# Patient Record
Sex: FEMALE | Race: BLACK OR AFRICAN AMERICAN | Employment: FULL TIME | URBAN - METROPOLITAN AREA
[De-identification: names, ages, dates, MRNs, and addresses within clinical notes are randomized per-mention and may not be internally consistent; named-entity substitution may affect disease eponyms.]

---

## 2017-05-06 ENCOUNTER — OFFICE VISIT (OUTPATIENT)
Dept: FAMILY MEDICINE CLINIC | Age: 32
End: 2017-05-06

## 2017-05-06 VITALS
SYSTOLIC BLOOD PRESSURE: 110 MMHG | BODY MASS INDEX: 31.79 KG/M2 | WEIGHT: 186.2 LBS | DIASTOLIC BLOOD PRESSURE: 70 MMHG | HEIGHT: 64 IN

## 2017-05-06 DIAGNOSIS — Z00.00 PREVENTATIVE HEALTH CARE: ICD-10-CM

## 2017-05-06 DIAGNOSIS — L03.011 PARONYCHIA OF FINGER OF RIGHT HAND: Primary | ICD-10-CM

## 2017-05-06 DIAGNOSIS — Z23 NEED FOR PNEUMOCOCCAL VACCINATION: ICD-10-CM

## 2017-05-06 PROCEDURE — 99213 OFFICE O/P EST LOW 20 MIN: CPT | Performed by: FAMILY MEDICINE

## 2017-05-06 RX ORDER — SULFAMETHOXAZOLE AND TRIMETHOPRIM 800; 160 MG/1; MG/1
1 TABLET ORAL 2 TIMES DAILY
Qty: 20 TABLET | Refills: 0 | Status: SHIPPED | OUTPATIENT
Start: 2017-05-06 | End: 2017-05-15 | Stop reason: SDUPTHER

## 2017-05-08 ENCOUNTER — TELEPHONE (OUTPATIENT)
Dept: FAMILY MEDICINE CLINIC | Age: 32
End: 2017-05-08

## 2017-05-15 ENCOUNTER — TELEPHONE (OUTPATIENT)
Dept: FAMILY MEDICINE CLINIC | Age: 32
End: 2017-05-15

## 2017-05-15 RX ORDER — SULFAMETHOXAZOLE AND TRIMETHOPRIM 800; 160 MG/1; MG/1
1 TABLET ORAL 2 TIMES DAILY
Qty: 8 TABLET | Refills: 0 | Status: SHIPPED | OUTPATIENT
Start: 2017-05-15 | End: 2017-05-19

## 2017-12-26 ENCOUNTER — OFFICE VISIT (OUTPATIENT)
Dept: FAMILY MEDICINE CLINIC | Age: 32
End: 2017-12-26

## 2017-12-26 VITALS
SYSTOLIC BLOOD PRESSURE: 120 MMHG | HEIGHT: 64 IN | WEIGHT: 193 LBS | BODY MASS INDEX: 32.95 KG/M2 | DIASTOLIC BLOOD PRESSURE: 76 MMHG

## 2017-12-26 DIAGNOSIS — M65.831 EXTENSOR TENOSYNOVITIS OF RIGHT WRIST: Primary | ICD-10-CM

## 2017-12-26 PROCEDURE — 99213 OFFICE O/P EST LOW 20 MIN: CPT | Performed by: FAMILY MEDICINE

## 2017-12-26 RX ORDER — METHYLPREDNISOLONE 4 MG/1
TABLET ORAL
Qty: 21 TABLET | Refills: 0 | Status: SHIPPED | OUTPATIENT
Start: 2017-12-26 | End: 2019-08-14

## 2018-09-17 ENCOUNTER — TELEPHONE (OUTPATIENT)
Dept: FAMILY MEDICINE CLINIC | Age: 33
End: 2018-09-17

## 2018-09-17 DIAGNOSIS — R06.02 SOB (SHORTNESS OF BREATH): ICD-10-CM

## 2018-09-17 DIAGNOSIS — R07.9 CHEST PAIN, UNSPECIFIED TYPE: Primary | ICD-10-CM

## 2018-09-18 ENCOUNTER — TELEPHONE (OUTPATIENT)
Dept: FAMILY MEDICINE CLINIC | Age: 33
End: 2018-09-18

## 2019-08-14 ENCOUNTER — OFFICE VISIT (OUTPATIENT)
Dept: FAMILY MEDICINE CLINIC | Age: 34
End: 2019-08-14
Payer: COMMERCIAL

## 2019-08-14 VITALS
WEIGHT: 187.4 LBS | HEIGHT: 64 IN | BODY MASS INDEX: 31.99 KG/M2 | SYSTOLIC BLOOD PRESSURE: 122 MMHG | DIASTOLIC BLOOD PRESSURE: 80 MMHG

## 2019-08-14 DIAGNOSIS — R60.9 DEPENDENT EDEMA: ICD-10-CM

## 2019-08-14 DIAGNOSIS — E16.2 HYPOGLYCEMIA: ICD-10-CM

## 2019-08-14 DIAGNOSIS — Z00.00 WELL ADULT EXAM: Primary | ICD-10-CM

## 2019-08-14 LAB
A/G RATIO: 1.7 (ref 1.1–2.2)
ALBUMIN SERPL-MCNC: 4.3 G/DL (ref 3.4–5)
ALP BLD-CCNC: 70 U/L (ref 40–129)
ALT SERPL-CCNC: 12 U/L (ref 10–40)
ANION GAP SERPL CALCULATED.3IONS-SCNC: 11 MMOL/L (ref 3–16)
AST SERPL-CCNC: 18 U/L (ref 15–37)
BILIRUB SERPL-MCNC: 0.6 MG/DL (ref 0–1)
BILIRUBIN, POC: NORMAL
BLOOD URINE, POC: NORMAL
BUN BLDV-MCNC: 8 MG/DL (ref 7–20)
CALCIUM SERPL-MCNC: 9.9 MG/DL (ref 8.3–10.6)
CHLORIDE BLD-SCNC: 105 MMOL/L (ref 99–110)
CHOLESTEROL, TOTAL: 177 MG/DL (ref 0–199)
CLARITY, POC: NORMAL
CO2: 25 MMOL/L (ref 21–32)
COLOR, POC: YELLOW
CREAT SERPL-MCNC: 0.7 MG/DL (ref 0.6–1.1)
GFR AFRICAN AMERICAN: >60
GFR NON-AFRICAN AMERICAN: >60
GLOBULIN: 2.6 G/DL
GLUCOSE BLD-MCNC: 85 MG/DL (ref 70–99)
GLUCOSE URINE, POC: NORMAL
HCT VFR BLD CALC: 39.8 % (ref 36–48)
HDLC SERPL-MCNC: 76 MG/DL (ref 40–60)
HEMOGLOBIN: 13.5 G/DL (ref 12–16)
KETONES, POC: NORMAL
LDL CHOLESTEROL CALCULATED: 88 MG/DL
LEUKOCYTE EST, POC: NORMAL
MCH RBC QN AUTO: 30.8 PG (ref 26–34)
MCHC RBC AUTO-ENTMCNC: 33.9 G/DL (ref 31–36)
MCV RBC AUTO: 90.6 FL (ref 80–100)
NITRITE, POC: NORMAL
PDW BLD-RTO: 13.8 % (ref 12.4–15.4)
PH, POC: 7.5
PLATELET # BLD: 324 K/UL (ref 135–450)
PMV BLD AUTO: 8.4 FL (ref 5–10.5)
POTASSIUM SERPL-SCNC: 4.2 MMOL/L (ref 3.5–5.1)
PROTEIN, POC: NORMAL
RBC # BLD: 4.39 M/UL (ref 4–5.2)
SODIUM BLD-SCNC: 141 MMOL/L (ref 136–145)
SPECIFIC GRAVITY, POC: 1.02
TOTAL PROTEIN: 6.9 G/DL (ref 6.4–8.2)
TRIGL SERPL-MCNC: 64 MG/DL (ref 0–150)
TSH SERPL DL<=0.05 MIU/L-ACNC: 0.78 UIU/ML (ref 0.27–4.2)
UROBILINOGEN, POC: 0.2
VITAMIN B-12: 702 PG/ML (ref 211–911)
VITAMIN D 25-HYDROXY: 27.8 NG/ML
VLDLC SERPL CALC-MCNC: 13 MG/DL
WBC # BLD: 5.3 K/UL (ref 4–11)

## 2019-08-14 PROCEDURE — 99395 PREV VISIT EST AGE 18-39: CPT | Performed by: FAMILY MEDICINE

## 2019-08-14 PROCEDURE — 81002 URINALYSIS NONAUTO W/O SCOPE: CPT | Performed by: FAMILY MEDICINE

## 2019-08-14 PROCEDURE — 36415 COLL VENOUS BLD VENIPUNCTURE: CPT | Performed by: FAMILY MEDICINE

## 2019-08-14 RX ORDER — BLOOD-GLUCOSE METER
1 KIT MISCELLANEOUS DAILY
Qty: 1 KIT | Refills: 0 | Status: SHIPPED | OUTPATIENT
Start: 2019-08-14

## 2019-08-14 RX ORDER — GLUCOSAMINE HCL/CHONDROITIN SU 500-400 MG
CAPSULE ORAL
Qty: 50 STRIP | Refills: 0 | Status: SHIPPED | OUTPATIENT
Start: 2019-08-14

## 2019-08-14 RX ORDER — LANCETS 30 GAUGE
1 EACH MISCELLANEOUS DAILY
Qty: 50 EACH | Refills: 2 | Status: SHIPPED | OUTPATIENT
Start: 2019-08-14

## 2019-08-14 ASSESSMENT — PATIENT HEALTH QUESTIONNAIRE - PHQ9
SUM OF ALL RESPONSES TO PHQ9 QUESTIONS 1 & 2: 0
1. LITTLE INTEREST OR PLEASURE IN DOING THINGS: 0
2. FEELING DOWN, DEPRESSED OR HOPELESS: 0
SUM OF ALL RESPONSES TO PHQ QUESTIONS 1-9: 0
SUM OF ALL RESPONSES TO PHQ QUESTIONS 1-9: 0

## 2019-08-14 ASSESSMENT — ENCOUNTER SYMPTOMS
RESPIRATORY NEGATIVE: 1
GASTROINTESTINAL NEGATIVE: 1

## 2019-08-14 NOTE — PROGRESS NOTES
Subjective:      Patient ID: Dorys Chappell is a 36 y.o. female. 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 Respiratory: Negative. Cardiovascular: Negative. Gastrointestinal: Negative. Skin: Negative. Neurological: Negative. Psychiatric/Behavioral: Negative. Allergies   Allergen Reactions    Amoxicillin     Duricef [Cefadroxil Monohydrate]     Penicillins     Tobramycin      Eye drops     Current Outpatient Medications   Medication Sig Dispense Refill    glucose monitoring kit (FREESTYLE) monitoring kit 1 kit by Does not apply route daily 1 kit 0    blood glucose monitor strips Test daily if feels sugar is low 50 strip 0    Lancets MISC 1 each by Does not apply route daily 50 each 2    ibuprofen (ADVIL;MOTRIN) 800 MG tablet Take 800 mg by mouth      fluticasone (FLOVENT HFA) 110 MCG/ACT inhaler INHALE TWO PUFFS BY MOUTH TWO TIMES A DAY 3 Inhaler 1    albuterol (PROAIR HFA) 108 (90 BASE) MCG/ACT inhaler INHALE ONE TO TWO PUFFS BY MOUTH EVERY 4 TO 6 HOURS AS NEEDED 17 g 3    hydrocortisone 2.5 % cream APPLY TO AFFECTED AREA(S) TWO TIMES A DAY 60 g 3    Spacer/Aero-Holding Chambers (AEROCHAMBER MV) MISC As directed 1 each 0     No current facility-administered medications for this visit.       Past Medical History:   Diagnosis Date    Allergic rhinitis     Asthma     Carpal tunnel syndrome     Eczema     Edema of both legs     Fatigue     Hyperlipidemia     Internal hemorrhoids      Past Surgical History:   Procedure Laterality Date    COLONOSCOPY      WISDOM TOOTH EXTRACTION       Social History Normocephalic. Right Ear: External ear normal.   Left Ear: External ear normal.   Nose: Nose normal.   Mouth/Throat: Oropharynx is clear and moist.   Eyes: Pupils are equal, round, and reactive to light. Conjunctivae and EOM are normal. Left eye exhibits no discharge. Neck: Normal range of motion. No thyromegaly present. Cardiovascular: Normal rate, regular rhythm, normal heart sounds and intact distal pulses. No murmur heard. Pulmonary/Chest: Effort normal. No respiratory distress. She has no wheezes. She has no rales. Abdominal: Soft. She exhibits no distension and no mass. There is no guarding. Lymphadenopathy:     She has no cervical adenopathy. Neurological: She is alert and oriented to person, place, and time. She has normal reflexes. Skin: Skin is warm and dry. No rash noted. No erythema. Psychiatric: She has a normal mood and affect. Her behavior is normal. Judgment and thought content normal.   Nursing note and vitals reviewed. Assessment:      Assessment/plan;  Stanislaw Castellano was seen today for annual exam.    Diagnoses and all orders for this visit:    Well adult exam  -     CBC  -     Comprehensive Metabolic Panel  -     Lipid Panel  -     Hemoglobin A1C  -     TSH without Reflex  -     Vitamin B12  -     Vitamin D 25 Hydroxy  -     POCT Urinalysis no Micro    Hypoglycemia  -     glucose monitoring kit (FREESTYLE) monitoring kit; 1 kit by Does not apply route daily  -     blood glucose monitor strips;  Test daily if feels sugar is low  -     Lancets MISC; 1 each by Does not apply route daily    Dependent edema    where compression hose while working   Watch salt  Discussed healthy lifestyle  Trev Barba

## 2019-08-15 LAB
ESTIMATED AVERAGE GLUCOSE: 116.9 MG/DL
HBA1C MFR BLD: 5.7 %

## 2019-11-06 ENCOUNTER — TELEPHONE (OUTPATIENT)
Dept: FAMILY MEDICINE CLINIC | Age: 34
End: 2019-11-06

## 2019-11-06 DIAGNOSIS — J45.40 MODERATE PERSISTENT ASTHMA WITHOUT COMPLICATION: Primary | ICD-10-CM

## 2019-11-08 RX ORDER — INHALER, ASSIST DEVICES
SPACER (EA) MISCELLANEOUS
Qty: 1 EACH | Refills: 0 | Status: SHIPPED | OUTPATIENT
Start: 2019-11-08

## 2019-11-08 RX ORDER — ALBUTEROL SULFATE 1.25 MG/3ML
1 SOLUTION RESPIRATORY (INHALATION) EVERY 6 HOURS PRN
Qty: 90 ML | Refills: 3 | Status: SHIPPED | OUTPATIENT
Start: 2019-11-08

## 2020-01-13 ENCOUNTER — TELEPHONE (OUTPATIENT)
Dept: FAMILY MEDICINE CLINIC | Age: 35
End: 2020-01-13

## 2020-01-13 NOTE — TELEPHONE ENCOUNTER
Patient is coming in on Wednesday for a urine check. She is having frequency and pain after urination. Need orders.

## 2020-01-15 ENCOUNTER — NURSE ONLY (OUTPATIENT)
Dept: FAMILY MEDICINE CLINIC | Age: 35
End: 2020-01-15
Payer: COMMERCIAL

## 2020-01-15 LAB
BILIRUBIN, POC: NORMAL
BLOOD URINE, POC: NORMAL
CLARITY, POC: NORMAL
COLOR, POC: NORMAL
GLUCOSE URINE, POC: NORMAL
KETONES, POC: NORMAL
LEUKOCYTE EST, POC: NORMAL
NITRITE, POC: NORMAL
PH, POC: 7
PROTEIN, POC: NORMAL
SPECIFIC GRAVITY, POC: 1.01
UROBILINOGEN, POC: 0.2

## 2020-01-15 PROCEDURE — 81002 URINALYSIS NONAUTO W/O SCOPE: CPT | Performed by: FAMILY MEDICINE

## 2020-01-21 ENCOUNTER — TELEPHONE (OUTPATIENT)
Dept: FAMILY MEDICINE CLINIC | Age: 35
End: 2020-01-21

## 2020-01-30 ENCOUNTER — TELEPHONE (OUTPATIENT)
Dept: FAMILY MEDICINE CLINIC | Age: 35
End: 2020-01-30

## 2020-01-30 NOTE — TELEPHONE ENCOUNTER
Dull aching pain in her left calf; this morning also had pain on left back of her thigh; denies warmth or redness at either site. Denies recent travel in January. Her parents do have a family history of blood clots. appt tomorrow or duplex?

## 2020-01-31 ENCOUNTER — TELEPHONE (OUTPATIENT)
Dept: FAMILY MEDICINE CLINIC | Age: 35
End: 2020-01-31

## 2020-01-31 NOTE — TELEPHONE ENCOUNTER
Patient scheduled at [facility] for STAT duplex today at 9:30 am.  [facility] [phone]  F: [phone]  Order faxed to attn of Alla Woodward. Patient advised. Vascular will call the office and hold the patient if she is positive for DVT.   LUCAS

## 2020-01-31 NOTE — TELEPHONE ENCOUNTER
Patient is calling back again to find out if dr Hillary Anthony wants her to have tests done or come in for an appt. If tests are needed she needs to go to .  Please return call

## 2020-02-03 ENCOUNTER — TELEPHONE (OUTPATIENT)
Dept: FAMILY MEDICINE CLINIC | Age: 35
End: 2020-02-03

## 2020-02-04 ENCOUNTER — OFFICE VISIT (OUTPATIENT)
Dept: FAMILY MEDICINE CLINIC | Age: 35
End: 2020-02-04
Payer: COMMERCIAL

## 2020-02-04 VITALS
HEIGHT: 64 IN | OXYGEN SATURATION: 96 % | DIASTOLIC BLOOD PRESSURE: 72 MMHG | WEIGHT: 196 LBS | BODY MASS INDEX: 33.46 KG/M2 | HEART RATE: 71 BPM | SYSTOLIC BLOOD PRESSURE: 118 MMHG

## 2020-02-04 LAB
A/G RATIO: 1.5 (ref 1.1–2.2)
ALBUMIN SERPL-MCNC: 4.2 G/DL (ref 3.4–5)
ALP BLD-CCNC: 69 U/L (ref 40–129)
ALT SERPL-CCNC: 13 U/L (ref 10–40)
ANION GAP SERPL CALCULATED.3IONS-SCNC: 15 MMOL/L (ref 3–16)
AST SERPL-CCNC: 18 U/L (ref 15–37)
BILIRUB SERPL-MCNC: 0.4 MG/DL (ref 0–1)
BUN BLDV-MCNC: 8 MG/DL (ref 7–20)
CALCIUM SERPL-MCNC: 9.8 MG/DL (ref 8.3–10.6)
CHLORIDE BLD-SCNC: 104 MMOL/L (ref 99–110)
CO2: 23 MMOL/L (ref 21–32)
CREAT SERPL-MCNC: 0.7 MG/DL (ref 0.6–1.1)
D DIMER: <200 NG/ML DDU (ref 0–229)
GFR AFRICAN AMERICAN: >60
GFR NON-AFRICAN AMERICAN: >60
GLOBULIN: 2.8 G/DL
GLUCOSE BLD-MCNC: 76 MG/DL (ref 70–99)
MAGNESIUM: 2 MG/DL (ref 1.8–2.4)
POTASSIUM SERPL-SCNC: 3.8 MMOL/L (ref 3.5–5.1)
SODIUM BLD-SCNC: 142 MMOL/L (ref 136–145)
TOTAL PROTEIN: 7 G/DL (ref 6.4–8.2)

## 2020-02-04 PROCEDURE — 99213 OFFICE O/P EST LOW 20 MIN: CPT | Performed by: FAMILY MEDICINE

## 2020-02-04 PROCEDURE — 36415 COLL VENOUS BLD VENIPUNCTURE: CPT | Performed by: FAMILY MEDICINE

## 2020-02-04 RX ORDER — TIZANIDINE 4 MG/1
4 TABLET ORAL 3 TIMES DAILY PRN
Qty: 30 TABLET | Refills: 0 | Status: SHIPPED | OUTPATIENT
Start: 2020-02-04 | End: 2021-04-28

## 2020-02-04 ASSESSMENT — ENCOUNTER SYMPTOMS
GASTROINTESTINAL NEGATIVE: 1
RESPIRATORY NEGATIVE: 1

## 2020-02-04 ASSESSMENT — PATIENT HEALTH QUESTIONNAIRE - PHQ9
SUM OF ALL RESPONSES TO PHQ QUESTIONS 1-9: 0
SUM OF ALL RESPONSES TO PHQ QUESTIONS 1-9: 0
1. LITTLE INTEREST OR PLEASURE IN DOING THINGS: 0
SUM OF ALL RESPONSES TO PHQ9 QUESTIONS 1 & 2: 0
2. FEELING DOWN, DEPRESSED OR HOPELESS: 0

## 2020-02-04 NOTE — PROGRESS NOTES
Subjective:      Patient ID: Kaushal Duncan is a 36 y.o. female. Leg Pain    The incident occurred 3 to 5 days ago. There was no injury mechanism. The pain is present in the left leg (left calf below knee and above ankle ). The quality of the pain is described as aching and cramping. The pain is at a severity of 5/10. The pain is moderate. The pain has been fluctuating since onset. Associated symptoms include an inability to bear weight. She reports no foreign bodies present. The symptoms are aggravated by movement, weight bearing and palpation. She has tried acetaminophen and rest for the symptoms. The treatment provided no relief. venous doppler negative    Review of Systems   Constitutional: Negative. Respiratory: Negative. Cardiovascular: Negative. Gastrointestinal: Negative. Musculoskeletal: Positive for arthralgias, gait problem and myalgias. Skin: Negative.       YOB: 1985    Date of Visit:  2/4/2020    Allergies   Allergen Reactions    Amoxicillin     Duricef [Cefadroxil Monohydrate]     Penicillins     Tobramycin      Eye drops       Outpatient Medications Marked as Taking for the 2/4/20 encounter (Office Visit) with Cecilia Page, DO   Medication Sig Dispense Refill    tiZANidine (ZANAFLEX) 4 MG tablet Take 1 tablet by mouth 3 times daily as needed (leg cramping) 30 tablet 0    albuterol (ACCUNEB) 1.25 MG/3ML nebulizer solution Inhale 3 mLs into the lungs every 6 hours as needed for Wheezing 90 mL 3    Spacer/Aero-Holding Chambers (AEROCHAMBER MV) MISC For use with inhaler  Dx asthma 1 each 0    glucose monitoring kit (FREESTYLE) monitoring kit 1 kit by Does not apply route daily 1 kit 0    blood glucose monitor strips Test daily if feels sugar is low 50 strip 0    Lancets MISC 1 each by Does not apply route daily 50 each 2    ibuprofen (ADVIL;MOTRIN) 800 MG tablet Take 800 mg by mouth      hydrocortisone 2.5 % cream APPLY TO AFFECTED AREA(S) TWO TIMES A

## 2020-02-27 ENCOUNTER — TELEPHONE (OUTPATIENT)
Dept: FAMILY MEDICINE CLINIC | Age: 35
End: 2020-02-27

## 2020-02-27 NOTE — TELEPHONE ENCOUNTER
Patient states that steven called her today she would like a return phone call. She also has questions about her labs. Please return call.

## 2020-04-07 ENCOUNTER — TELEPHONE (OUTPATIENT)
Dept: FAMILY MEDICINE CLINIC | Age: 35
End: 2020-04-07

## 2020-04-07 NOTE — TELEPHONE ENCOUNTER
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  States she is already using her inhaler a little more frequently right now due to it being spring time and the pollen being up

## 2020-04-18 NOTE — PROGRESS NOTES
Subjective:      Patient ID: Ronnette Carrel is a 33 y.o. female. HPI     Right Arm Swelling:  Patient states that 10 days ago, she was stirring macaroni and cheese and began to feel pain in the dorsal right wrist and distal forearm. It has bothered her since then especially with any use of the right hand. She states that she has been taking Ibuprofen and using ice and wrapping it. It was previously sore to the touch but that has resolved. She denies any known injury or prior history. Review of Systems   Constitutional: Negative for chills and fever. Musculoskeletal: Positive for myalgias. /76 (Site: Left Arm)   Ht 5' 3.5\" (1.613 m)   Wt 193 lb (87.5 kg)   BMI 33.65 kg/m²    Objective:   Physical Exam   Constitutional: She is oriented to person, place, and time. She appears well-developed and well-nourished. No distress. Musculoskeletal:   There is mild pain and swelling on palpation of the dorsal right wrist / forearm. There is some friction palpated on wrist ROM. Neurological: She is alert and oriented to person, place, and time. Skin: She is not diaphoretic.        Assessment:      Right Wrist Tenosynovitis       Plan:      Medrol Dosepak as directed  Continue Ibuprofen / Wraps and Ice  RTO per Dr. Junior Irizarry walking/toileting/standing

## 2020-04-28 ENCOUNTER — OFFICE VISIT (OUTPATIENT)
Dept: FAMILY MEDICINE CLINIC | Age: 35
End: 2020-04-28
Payer: COMMERCIAL

## 2020-04-28 VITALS
DIASTOLIC BLOOD PRESSURE: 62 MMHG | BODY MASS INDEX: 34.2 KG/M2 | SYSTOLIC BLOOD PRESSURE: 128 MMHG | HEIGHT: 63 IN | WEIGHT: 193 LBS

## 2020-04-28 PROCEDURE — 99214 OFFICE O/P EST MOD 30 MIN: CPT | Performed by: FAMILY MEDICINE

## 2020-04-28 RX ORDER — SUCRALFATE 1 G/1
1 TABLET ORAL
COMMUNITY
Start: 2020-04-26 | End: 2021-04-28 | Stop reason: ALTCHOICE

## 2020-04-28 RX ORDER — ONDANSETRON 4 MG/1
4 TABLET, ORALLY DISINTEGRATING ORAL EVERY 6 HOURS PRN
COMMUNITY
Start: 2020-04-26

## 2020-04-28 RX ORDER — PANTOPRAZOLE SODIUM 40 MG/1
40 TABLET, DELAYED RELEASE ORAL
COMMUNITY
Start: 2020-04-26

## 2020-04-28 ASSESSMENT — ENCOUNTER SYMPTOMS
NAUSEA: 1
DIARRHEA: 1
ABDOMINAL PAIN: 1

## 2020-04-28 ASSESSMENT — PATIENT HEALTH QUESTIONNAIRE - PHQ9
SUM OF ALL RESPONSES TO PHQ9 QUESTIONS 1 & 2: 0
SUM OF ALL RESPONSES TO PHQ QUESTIONS 1-9: 0
1. LITTLE INTEREST OR PLEASURE IN DOING THINGS: 0
2. FEELING DOWN, DEPRESSED OR HOPELESS: 0
SUM OF ALL RESPONSES TO PHQ QUESTIONS 1-9: 0

## 2020-04-28 NOTE — PATIENT INSTRUCTIONS
have streaks of blood.     · You vomit.    Watch closely for changes in your health, and be sure to contact your doctor if:    · You do not get better as expected. Where can you learn more? Go to [email or web address] and sign in to your SpaceList account. Enter N648 in the [facility] box to learn more about \"Peptic Ulcer Disease: Care Instructions. \"     If you do not have an account, please click on the \"Sign Up Now\" link. Current as of: August 11, 2019Content Version: 12.4  © 5067-5264 Healthwise, Incorporated. Care instructions adapted under license by [facility] ([facility]). If you have questions about a medical condition or this instruction, always ask your healthcare professional. [address] any warranty or liability for your use of this information. Patient Education        Learning About the Diet for Swallowing Problems  What are swallowing problems? Difficulty swallowing is also called dysphagia (say \"gqh-UIC-hid-uh\"). It is most often a sign of a problem with your throat or esophagus. This is the tube that moves food and liquids from the back of your mouth to your stomach. Trouble swallowing can occur when the muscles and nerves that move food through the throat and esophagus are not working right. To help you swallow food, your doctor or speech therapist may advise a special dysphagia diet for you. Why is a special diet important? A dysphagia diet can help you handle some problems that can occur when it's hard to swallow food and liquids easily. These problems can include:  · Malnutrition. This means you aren't getting enough healthy foods to keep your body working well. · Dehydration. This means you aren't getting enough liquids to keep your body healthy. · Aspiration. This means that food, liquid, or saliva goes down your windpipe (trachea) into your lungs, instead of down your esophagus to your stomach.  This can lead to aspiration

## 2020-08-10 ENCOUNTER — TELEPHONE (OUTPATIENT)
Dept: FAMILY MEDICINE CLINIC | Age: 35
End: 2020-08-10

## 2020-08-10 RX ORDER — INHALER, ASSIST DEVICES
SPACER (EA) MISCELLANEOUS
Qty: 1 EACH | Refills: 0 | Status: SHIPPED | OUTPATIENT
Start: 2020-08-10

## 2020-08-10 NOTE — TELEPHONE ENCOUNTER
Pt calling for refill of Spacer/Aero-Holding Chambers (AEROCHAMBJESSICA BERRY) [facility]     Send to [address] [facility]

## 2020-10-30 ENCOUNTER — OFFICE VISIT (OUTPATIENT)
Dept: FAMILY MEDICINE CLINIC | Age: 35
End: 2020-10-30
Payer: COMMERCIAL

## 2020-10-30 VITALS
TEMPERATURE: 97.4 F | SYSTOLIC BLOOD PRESSURE: 122 MMHG | HEIGHT: 63 IN | WEIGHT: 196 LBS | BODY MASS INDEX: 34.73 KG/M2 | DIASTOLIC BLOOD PRESSURE: 70 MMHG

## 2020-10-30 PROCEDURE — 99213 OFFICE O/P EST LOW 20 MIN: CPT | Performed by: FAMILY MEDICINE

## 2020-10-30 RX ORDER — DICLOFENAC SODIUM 75 MG/1
75 TABLET, DELAYED RELEASE ORAL 2 TIMES DAILY
Qty: 30 TABLET | Refills: 0 | Status: SHIPPED | OUTPATIENT
Start: 2020-10-30 | End: 2020-11-14

## 2020-10-30 NOTE — PATIENT INSTRUCTIONS
Patient Education        Debo Montalvo Disease: Exercises  Introduction  Here are some examples of exercises for you to try. The exercises may be suggested for a condition or for rehabilitation. Start each exercise slowly. Ease off the exercises if you start to have pain. You will be told when to start these exercises and which ones will work best for you. How to do the exercises  Thumb lifts   1. Place your hand on a flat surface, with your palm up. 2. Lift your thumb away from your palm to make a \"C\" shape. 3. Hold for about 6 seconds. 4. Repeat 8 to 12 times. Passive thumb MP flexion   1. Hold your hand in front of you, and turn your hand so your little finger faces down and your thumb faces up. (Your hand should be in the position used for shaking someone's hand.) You may also rest your hand on a flat surface. 2. Use the fingers on your other hand to bend your thumb down at the point where your thumb connects to your palm. 3. Hold for at least 15 to 30 seconds. 4. Repeat 2 to 4 times. Finkelstein stretch   1. Hold your arms out in front of you. (Your hand should be in the position used for shaking someone's hand.)  2. Bend your thumb toward your palm. 3. Use your other hand to gently stretch your thumb and wrist downward until you feel the stretch on the thumb side of your wrist.  4. Hold for at least 15 to 30 seconds. 5. Repeat 2 to 4 times. Resisted ulnar deviation   For this exercise, you will need elastic exercise material, such as surgical tubing or Thera-Band. 1. Sit leaning forward with your legs slightly spread and your elbow on your thigh. 2. Grasp one end of the band with your palm down, and step on the other end with the foot opposite the hand holding the band. 3. Slowly bend your wrist sideways and away from your knee. 4. Repeat 8 to 12 times. Follow-up care is a key part of your treatment and safety.  Be sure to make and go to all appointments, and call your doctor if you

## 2020-10-30 NOTE — PROGRESS NOTES
Subjective:      Patient ID: Aminta Levine is a 30 y.o. female. Wrist Pain    The pain is present in the right wrist. This is a new problem. The current episode started 1 to 4 weeks ago. The problem occurs intermittently. The problem has been waxing and waning. The quality of the pain is described as aching and burning. The pain is at a severity of 6/10. The pain is moderate. Associated symptoms include a limited range of motion and stiffness. Pertinent negatives include no fever, inability to bear weight, itching or numbness. The symptoms are aggravated by activity. Treatments tried: went to urgent care  took medrol dose pack which did not help. The treatment provided no relief. Review of Systems   Constitutional: Negative for fever. Musculoskeletal: Positive for stiffness. Skin: Negative for itching. Neurological: Negative for numbness. YOB: 1985    Date of Visit:  10/30/2020    Allergies   Allergen Reactions    Amoxicillin     Duricef [Cefadroxil Monohydrate]     Penicillins     Tobramycin      Eye drops       Outpatient Medications Marked as Taking for the 10/30/20 encounter (Office Visit) with Afton Scheuermann,    Medication Sig Dispense Refill    pantoprazole (PROTONIX) 40 MG tablet Take 40 mg by mouth every morning (before breakfast)      fluticasone (FLOVENT HFA) 110 MCG/ACT inhaler INHALE TWO PUFFS BY MOUTH TWO TIMES A DAY 3 Inhaler 1    albuterol (PROAIR HFA) 108 (90 BASE) MCG/ACT inhaler INHALE ONE TO TWO PUFFS BY MOUTH EVERY 4 TO 6 HOURS AS NEEDED 17 g 3       Vitals:    10/30/20 1555   BP: 122/70   Temp: 97.4 °F (36.3 °C)   Weight: 196 lb (88.9 kg)   Height: 5' 3\" (1.6 m)     Body mass index is 34.72 kg/m².      Wt Readings from Last 3 Encounters:   10/30/20 196 lb (88.9 kg)   04/28/20 193 lb (87.5 kg)   02/04/20 196 lb (88.9 kg)     BP Readings from Last 3 Encounters:   10/30/20 122/70   04/28/20 128/62   02/04/20 118/72       Objective:   Physical Exam  Constitutional:       General: She is not in acute distress. Appearance: She is well-developed. HENT:      Head: Normocephalic. Musculoskeletal:      Comments: Right wrist with tenderness near base of thumb  Pain with flex and ext   Radial pulse is strong   Neurological:      Mental Status: She is alert and oriented to person, place, and time. Psychiatric:         Behavior: Behavior normal.         Thought Content: Thought content normal.         Judgment: Judgment normal.         Assessment:        Assessment/plan;  Leny was seen today for wrist pain. Diagnoses and all orders for this visit:    Tendinitis, de Quervain's  -     diclofenac (VOLTAREN) 75 MG EC tablet; Take 1 tablet by mouth 2 times daily for 15 days  -     XR WRIST RIGHT (2 VIEWS); Future  -     XR HAND RIGHT (2 VIEWS); Future    Right wrist pain  -     XR WRIST RIGHT (2 VIEWS); Future    Pain of right thumb  -     XR HAND RIGHT (2 VIEWS);  Future      Brace and exercises given   Xray next week if not improving    EDMUND HUTCHINSON, DO

## 2020-11-06 ENCOUNTER — TELEPHONE (OUTPATIENT)
Dept: FAMILY MEDICINE CLINIC | Age: 35
End: 2020-11-06

## 2020-11-06 RX ORDER — PREDNISONE 10 MG/1
10 TABLET ORAL DAILY
Qty: 18 TABLET | Refills: 0 | Status: SHIPPED | OUTPATIENT
Start: 2020-11-06 | End: 2021-04-28 | Stop reason: ALTCHOICE

## 2020-11-06 NOTE — TELEPHONE ENCOUNTER
Patient is calling stating Rajesh Deleon told her to call her if she got the x ray done she got it done today at [facility]

## 2020-12-11 ENCOUNTER — TELEPHONE (OUTPATIENT)
Dept: FAMILY MEDICINE CLINIC | Age: 35
End: 2020-12-11

## 2020-12-11 NOTE — TELEPHONE ENCOUNTER
Patient called and stated that she was having a bowel movement yesterday and she stated that when she wiped she pulled something out that was a rubberier substance, that is flat with a point at the end. She stated that it did not hurt and she knows that is wasn't her stool. Patient has the substance or thing and is wondering if Dr Edouard Jimenez should needs to see it. She stated she has never seen a parasite before so she is not sure if that is it. She said she was experiencing gassiness, and itchiness around the rectal area.      Please call patient back once this message is addressed, patient is aware that Dr Edouard Jimenez is out of the office until Tuesday,     Patients call back number [phone]

## 2020-12-12 ENCOUNTER — HOSPITAL ENCOUNTER (OUTPATIENT)
Age: 35
Discharge: HOME | End: 2020-12-12
Payer: COMMERCIAL

## 2020-12-12 PROCEDURE — 87209 SMEAR COMPLEX STAIN: CPT

## 2020-12-12 PROCEDURE — 87177 OVA AND PARASITES SMEARS: CPT

## 2020-12-12 PROCEDURE — 87506 IADNA-DNA/RNA PROBE TQ 6-11: CPT

## 2020-12-22 LAB
CAMPYLOBACTER JEJUNI/COLI PCR: NOT DETECTED
CAMPYLOBACTER UPSALIENSIS: ABNORMAL
E COLI SHIGELLA/ENTEROINVASIVE PCR: NOT DETECTED
SALMONELLA PCR: NOT DETECTED
SHIGA TOXIN I: NOT DETECTED
SHIGA TOXIN II: NOT DETECTED

## 2021-01-29 RX ORDER — ALBUTEROL SULFATE 90 UG/1
AEROSOL, METERED RESPIRATORY (INHALATION)
Qty: 17 G | Refills: 0 | Status: SHIPPED | OUTPATIENT
Start: 2021-01-29 | End: 2021-07-29

## 2021-01-29 RX ORDER — FLUTICASONE PROPIONATE 110 UG/1
AEROSOL, METERED RESPIRATORY (INHALATION)
Qty: 36 G | Refills: 0 | Status: SHIPPED | OUTPATIENT
Start: 2021-01-29 | End: 2021-07-29

## 2021-03-17 ENCOUNTER — TELEPHONE (OUTPATIENT)
Dept: FAMILY MEDICINE CLINIC | Age: 36
End: 2021-03-17

## 2021-03-17 DIAGNOSIS — Z12.31 ENCOUNTER FOR SCREENING MAMMOGRAM FOR MALIGNANT NEOPLASM OF BREAST: Primary | ICD-10-CM

## 2021-03-17 NOTE — TELEPHONE ENCOUNTER
----- Message from [facility] sent at 3/17/2021  3:26 PM EDT -----  Subject: Message to Provider    QUESTIONS  Information for Provider? Requesting an order for a mammogram . Please   follow up to advise   ---------------------------------------------------------------------------  --------------  CALL BACK INFO  What is the best way for the office to contact you? OK to leave message on   voicemail  Preferred Call Back Phone Number? [phone]  ---------------------------------------------------------------------------  --------------  SCRIPT ANSWERS  Relationship to Patient?  Self

## 2021-04-28 ENCOUNTER — OFFICE VISIT (OUTPATIENT)
Dept: FAMILY MEDICINE CLINIC | Age: 36
End: 2021-04-28
Payer: COMMERCIAL

## 2021-04-28 VITALS
BODY MASS INDEX: 34.2 KG/M2 | WEIGHT: 193 LBS | HEIGHT: 63 IN | DIASTOLIC BLOOD PRESSURE: 60 MMHG | SYSTOLIC BLOOD PRESSURE: 98 MMHG

## 2021-04-28 DIAGNOSIS — Z00.00 WELL ADULT EXAM: Primary | ICD-10-CM

## 2021-04-28 PROCEDURE — 99395 PREV VISIT EST AGE 18-39: CPT | Performed by: FAMILY MEDICINE

## 2021-04-28 RX ORDER — CYCLOBENZAPRINE HCL 10 MG
10 TABLET ORAL 3 TIMES DAILY PRN
COMMUNITY
Start: 2020-10-01

## 2021-04-28 ASSESSMENT — PATIENT HEALTH QUESTIONNAIRE - PHQ9
SUM OF ALL RESPONSES TO PHQ QUESTIONS 1-9: 0
1. LITTLE INTEREST OR PLEASURE IN DOING THINGS: 0
2. FEELING DOWN, DEPRESSED OR HOPELESS: 0
SUM OF ALL RESPONSES TO PHQ QUESTIONS 1-9: 0
SUM OF ALL RESPONSES TO PHQ QUESTIONS 1-9: 0
SUM OF ALL RESPONSES TO PHQ9 QUESTIONS 1 & 2: 0

## 2021-04-28 ASSESSMENT — ENCOUNTER SYMPTOMS
GASTROINTESTINAL NEGATIVE: 1
RESPIRATORY NEGATIVE: 1

## 2021-04-28 NOTE — PROGRESS NOTES
OUTPATIENT PROGRESS NOTE  Date of Service:  4/28/2021  Address: [facility]  [facility]  [address] [facility],[address] [address]  Dept: [phone]  Loc: [phone]    Subjective:      Patient ID:  <[id number]>  Sherice Rivers is a 29 y.o. female     HPI  Well exam    Left cts  Wears brace sometimes    Left thumb pain cyst at the base of her thumb  She wants to have her blood work done at work  Asthma  Using her rescue inhaler several times a week  Wants to discuss a preventative inhalers   Review of Systems   Constitutional: Negative. Respiratory: Negative. Cardiovascular: Negative. Gastrointestinal: Negative. Musculoskeletal: Positive for arthralgias and myalgias. Skin: Negative. Neurological: Positive for numbness (left hand). Objective:   YOB: 1985    Date of Visit:  4/28/2021       Allergies   Allergen Reactions    Amoxicillin     Duricef [Cefadroxil Monohydrate]     Penicillins     Tobramycin      Eye drops       Outpatient Medications Marked as Taking for the 4/28/21 encounter (Office Visit) with Ventura Fong, DO   Medication Sig Dispense Refill    cyclobenzaprine (FLEXERIL) 10 MG tablet Take 10 mg by mouth 3 times daily as needed      fluticasone (FLOVENT HFA) 110 MCG/ACT inhaler Inhale 2 puffs into the lungs 2 times a day. 36 g 0    albuterol sulfate  (90 Base) MCG/ACT inhaler Inhale 1 to 2 puffs into the lungs every 4 to 6 hours as needed. 17 g 0    Spacer/Aero-Holding Chambers (AEROCHAMBER MV) [facility] As directed 1 each 0    hydrocortisone 2.5 % cream Apply topically to affected area(s) 2 times a day.  60 g 0    ondansetron (ZOFRAN-ODT) 4 MG disintegrating tablet Take 4 mg by mouth every 6 hours as needed      pantoprazole (PROTONIX) 40 MG tablet Take 40 mg by mouth every morning (before breakfast)      albuterol (ACCUNEB) 1.25 MG/3ML nebulizer solution Inhale 3 mLs into the lungs every 6 Tendon Reflexes: Reflexes are normal and symmetric. Psychiatric:         Behavior: Behavior normal.         Thought Content: Thought content normal.         Judgment: Judgment normal.            Assessment/Plan           Assessment/plan;  Elena Yang was seen today for annual exam, nausea and hearing problem. Diagnoses and all orders for this visit:    Well adult exam  -     IRON; Future  -     CBC; Future  -     Comprehensive Metabolic Panel; Future  -     Lipid Panel; Future  -     Vitamin B12; Future  -     Vitamin D 25 Hydroxy; Future  -     Hemoglobin A1C; Future  -     VARICELLA ZOSTER ANTIBODY, IGG; Future    Other orders  -     hydrocortisone 2.5 % cream; Apply topically to affected area(s) 2 times a day. -     mometasone (ASMANEX, 30 METERED DOSES,) 220 MCG/INH inhaler;  Inhale 1 puff into the lungs daily                   Opal Ashleigh, DO

## 2021-04-28 NOTE — PATIENT INSTRUCTIONS
Patient Education        Carpal Tunnel Syndrome: Exercises  Introduction  Here are some examples of exercises for you to try. The exercises may be suggested for a condition or for rehabilitation. Start each exercise slowly. Ease off the exercises if you start to have pain. You will be told when to start these exercises and which ones will work best for you. Warm-up stretches  When you no longer have pain or numbness, you can do exercises to help prevent carpal tunnel syndrome from coming back. Do not do any stretch or movement that is uncomfortable or painful. 1. Rotate your wrist up, down, and from side to side. Repeat 4 times. 2. Stretch your fingers far apart. Relax them, and then stretch them again. Repeat 4 times. 3. Stretch your thumb by pulling it back gently, holding it, and then releasing it. Repeat 4 times. How to do the exercises  Prayer stretch   1. Start with your palms together in front of your chest just below your chin. 2. Slowly lower your hands toward your waistline, keeping your hands close to your stomach and your palms together until you feel a mild to moderate stretch under your forearms. 3. Hold for at least 15 to 30 seconds. Repeat 2 to 4 times. Wrist flexor stretch   1. Extend your arm in front of you with your palm up. 2. Bend your wrist, pointing your hand toward the floor. 3. With your other hand, gently bend your wrist farther until you feel a mild to moderate stretch in your forearm. 4. Hold for at least 15 to 30 seconds. Repeat 2 to 4 times. Wrist extensor stretch   1. Repeat steps 1 through 4 of the stretch above, but begin with your extended hand palm down. Follow-up care is a key part of your treatment and safety. Be sure to make and go to all appointments, and call your doctor if you are having problems. It's also a good idea to know your test results and keep a list of the medicines you take. Where can you learn more?   Go to [email or web address] and sign in to your BioAnal[employer] Systemshart account. Enter S722 in the [facility] box to learn more about \"Carpal Tunnel Syndrome: Exercises. \"     If you do not have an account, please click on the \"Sign Up Now\" link. Current as of: November 16, 2020               Content Version: 12.8  © 9115-2640 [employer], [facility]. Care instructions adapted under license by [facility] ([facility]). If you have questions about a medical condition or this instruction, always ask your healthcare professional. [address] any warranty or liability for your use of this information.

## 2021-05-25 ENCOUNTER — TELEPHONE (OUTPATIENT)
Dept: FAMILY MEDICINE CLINIC | Age: 36
End: 2021-05-25

## 2021-05-25 DIAGNOSIS — Z20.822 CLOSE EXPOSURE TO COVID-19 VIRUS: Primary | ICD-10-CM

## 2021-05-25 LAB
ALBUMIN SERPL-MCNC: 4.2 G/DL (ref 3.5–5.7)
ALP BLD-CCNC: 54 U/L (ref 36–125)
ALT SERPL-CCNC: 11 U/L (ref 7–52)
ANION GAP SERPL CALCULATED.3IONS-SCNC: 10 MMOL/L (ref 3–16)
AST SERPL-CCNC: 16 U/L (ref 13–39)
BILIRUB SERPL-MCNC: 0.8 MG/DL (ref 0–1.5)
BUN BLDV-MCNC: 10 MG/DL (ref 7–25)
CALCIUM SERPL-MCNC: 9.6 MG/DL (ref 8.6–10.3)
CHLORIDE BLD-SCNC: 106 MMOL/L (ref 98–110)
CHOLESTEROL, TOTAL: 171 MG/DL (ref 0–200)
CO2: 27 MMOL/L (ref 21–33)
CREAT SERPL-MCNC: 0.78 MG/DL (ref 0.6–1.3)
GFR, ESTIMATED: >90 SEE NOTE.
GFR, ESTIMATED: >90 SEE NOTE.
GLUCOSE BLD-MCNC: 79 MG/DL (ref 70–100)
HBA1C MFR BLD: 5.8 % (ref 4–5.6)
HCT VFR BLD CALC: 37.7 % (ref 35–45)
HDLC SERPL-MCNC: 66 MG/DL (ref 60–92)
HEMOGLOBIN: 13 G/DL (ref 11.7–15.5)
IRON SATURATION: 342 UG/DL (ref 265–497)
IRON: 120 UG/DL (ref 50–212)
LDL CHOLESTEROL CALCULATED: 95 MG/DL
MCH RBC QN AUTO: 30.3 PG (ref 27–33)
MCHC RBC AUTO-ENTMCNC: 34.3 G/DL (ref 32–36)
MCV RBC AUTO: 88.3 FL (ref 80–100)
OSMOLALITY CALCULATION: 294 MOSM/KG (ref 278–305)
PDW BLD-RTO: 14.1 % (ref 11–15)
PLATELET # BLD: 330 10E3/UL (ref 140–400)
PMV BLD AUTO: 7.5 FL (ref 7.5–11.5)
POTASSIUM SERPL-SCNC: 4.4 MMOL/L (ref 3.5–5.3)
RBC # BLD: 4.28 10E6/UL (ref 3.8–5.1)
SARS-COV-2, IGG: NORMAL
SAT: 35.1 % (ref 15–55)
SODIUM BLD-SCNC: 143 MMOL/L (ref 133–146)
TOTAL PROTEIN: 6.8 G/DL (ref 6.4–8.9)
TRIGL SERPL-MCNC: 51 MG/DL (ref 10–149)
VITAMIN B-12: 506 PG/ML (ref 180–914)
WBC # BLD: 5.3 10E3/UL (ref 3.8–10.8)

## 2021-05-25 NOTE — TELEPHONE ENCOUNTER
Pt called to ask Dr Ana Murray if she would add a ovid antibody test on to her labs. She is at [facility] now. Please give pt a call [phone].  [facility] fax # [phone]

## 2021-05-26 LAB — VITAMIN D 25-HYDROXY: 29.3 NG/ML (ref 30–100)

## 2021-07-29 RX ORDER — ALBUTEROL SULFATE 90 UG/1
AEROSOL, METERED RESPIRATORY (INHALATION)
Qty: 17 G | Refills: 0 | Status: SHIPPED | OUTPATIENT
Start: 2021-07-29 | End: 2022-04-05

## 2021-07-29 RX ORDER — FLUTICASONE PROPIONATE 110 UG/1
AEROSOL, METERED RESPIRATORY (INHALATION)
Qty: 36 G | Refills: 0 | Status: SHIPPED | OUTPATIENT
Start: 2021-07-29

## 2022-04-05 RX ORDER — ALBUTEROL SULFATE 90 UG/1
AEROSOL, METERED RESPIRATORY (INHALATION)
Qty: 17 G | Refills: 0 | Status: SHIPPED | OUTPATIENT
Start: 2022-04-05 | End: 2022-07-01

## 2022-05-06 ENCOUNTER — TELEPHONE (OUTPATIENT)
Dept: FAMILY MEDICINE CLINIC | Age: 37
End: 2022-05-06

## 2022-05-06 DIAGNOSIS — S99.922A FOOT INJURY, LEFT, INITIAL ENCOUNTER: Primary | ICD-10-CM

## 2022-05-06 DIAGNOSIS — S99.912A INJURY OF LEFT ANKLE, INITIAL ENCOUNTER: ICD-10-CM

## 2022-05-06 NOTE — TELEPHONE ENCOUNTER
Patient called stating foot and ankle pain since rolling her ankle on 4/25.  Patient states she has been using ice and ibuprofen, but still has throbbing pain

## 2022-05-06 NOTE — TELEPHONE ENCOUNTER
xrays ordered and faxed to [facility] radiology    Pt informed

## 2022-07-01 RX ORDER — ALBUTEROL SULFATE 90 UG/1
AEROSOL, METERED RESPIRATORY (INHALATION)
Qty: 17 G | Refills: 0 | Status: SHIPPED | OUTPATIENT
Start: 2022-07-01

## 2022-10-04 ENCOUNTER — TELEPHONE (OUTPATIENT)
Dept: FAMILY MEDICINE CLINIC | Age: 37
End: 2022-10-04

## 2022-10-04 DIAGNOSIS — Z12.31 ENCOUNTER FOR SCREENING MAMMOGRAM FOR MALIGNANT NEOPLASM OF BREAST: Primary | ICD-10-CM

## 2022-10-04 NOTE — TELEPHONE ENCOUNTER
Pt would like an order for a mammagrm.   [facility]

## 2022-10-05 NOTE — TELEPHONE ENCOUNTER
Spoke with patient and informed her that mammogram order was placed. Order faxed to [address] patient verbalized understanding.

## 2022-10-06 ENCOUNTER — TELEPHONE (OUTPATIENT)
Dept: FAMILY MEDICINE CLINIC | Age: 37
End: 2022-10-06

## 2022-10-06 NOTE — TELEPHONE ENCOUNTER
----- Message from Amilcar Coon sent at 10/5/2022  4:10 PM EDT -----  Subject: Message to Provider    QUESTIONS  Information for Provider? 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 Please reach out to the PT and discuss. ---------------------------------------------------------------------------  --------------  Kecia Szymanski [facility]  [phone]; OK to leave message on voicemail  ---------------------------------------------------------------------------  --------------  SCRIPT ANSWERS  Relationship to Patient?  Self

## 2022-10-06 NOTE — TELEPHONE ENCOUNTER
Left VM for patient informing her Dr. Ashutosh Feng does perform pap smears for patients- but that patient needs to call her insurance company and confirm that they will cover it and that she is due for one as guidelines have changed. Advised patient to call back with any questions or concerns.

## 2022-11-23 ENCOUNTER — OFFICE VISIT (OUTPATIENT)
Dept: FAMILY MEDICINE CLINIC | Age: 37
End: 2022-11-23
Payer: COMMERCIAL

## 2022-11-23 VITALS
SYSTOLIC BLOOD PRESSURE: 108 MMHG | BODY MASS INDEX: 33.54 KG/M2 | HEIGHT: 63 IN | DIASTOLIC BLOOD PRESSURE: 72 MMHG | WEIGHT: 189.3 LBS

## 2022-11-23 DIAGNOSIS — R53.83 FATIGUE, UNSPECIFIED TYPE: ICD-10-CM

## 2022-11-23 DIAGNOSIS — Z83.3 FAMILY HISTORY OF DIABETES MELLITUS (DM): ICD-10-CM

## 2022-11-23 DIAGNOSIS — L30.9 ECZEMA OF BOTH HANDS: ICD-10-CM

## 2022-11-23 DIAGNOSIS — Z00.00 WELL ADULT EXAM: Primary | ICD-10-CM

## 2022-11-23 LAB
A/G RATIO: 1.7 (ref 1.1–2.2)
ALBUMIN SERPL-MCNC: 4.3 G/DL (ref 3.4–5)
ALP BLD-CCNC: 59 U/L (ref 40–129)
ALT SERPL-CCNC: 12 U/L (ref 10–40)
ANION GAP SERPL CALCULATED.3IONS-SCNC: 12 MMOL/L (ref 3–16)
AST SERPL-CCNC: 16 U/L (ref 15–37)
BILIRUB SERPL-MCNC: 0.7 MG/DL (ref 0–1)
BUN BLDV-MCNC: 9 MG/DL (ref 7–20)
CALCIUM SERPL-MCNC: 9.6 MG/DL (ref 8.3–10.6)
CHLORIDE BLD-SCNC: 105 MMOL/L (ref 99–110)
CHOLESTEROL, TOTAL: 183 MG/DL (ref 0–199)
CO2: 24 MMOL/L (ref 21–32)
CREAT SERPL-MCNC: 0.7 MG/DL (ref 0.6–1.1)
GFR SERPL CREATININE-BSD FRML MDRD: >60 ML/MIN/{1.73_M2}
GLUCOSE BLD-MCNC: 78 MG/DL (ref 70–99)
HCT VFR BLD CALC: 36.8 % (ref 36–48)
HDLC SERPL-MCNC: 76 MG/DL (ref 40–60)
HEMOGLOBIN: 12 G/DL (ref 12–16)
LDL CHOLESTEROL CALCULATED: 95 MG/DL
MCH RBC QN AUTO: 29.5 PG (ref 26–34)
MCHC RBC AUTO-ENTMCNC: 32.7 G/DL (ref 31–36)
MCV RBC AUTO: 90.3 FL (ref 80–100)
PDW BLD-RTO: 14.4 % (ref 12.4–15.4)
PLATELET # BLD: 287 K/UL (ref 135–450)
PMV BLD AUTO: 8.1 FL (ref 5–10.5)
POTASSIUM SERPL-SCNC: 4 MMOL/L (ref 3.5–5.1)
RBC # BLD: 4.08 M/UL (ref 4–5.2)
SODIUM BLD-SCNC: 141 MMOL/L (ref 136–145)
TOTAL PROTEIN: 6.9 G/DL (ref 6.4–8.2)
TRIGL SERPL-MCNC: 58 MG/DL (ref 0–150)
TSH SERPL DL<=0.05 MIU/L-ACNC: 0.75 UIU/ML (ref 0.27–4.2)
VITAMIN B-12: 693 PG/ML (ref 211–911)
VITAMIN D 25-HYDROXY: 33.5 NG/ML
VLDLC SERPL CALC-MCNC: 12 MG/DL
WBC # BLD: 6.2 K/UL (ref 4–11)

## 2022-11-23 PROCEDURE — 99395 PREV VISIT EST AGE 18-39: CPT | Performed by: FAMILY MEDICINE

## 2022-11-23 PROCEDURE — 36415 COLL VENOUS BLD VENIPUNCTURE: CPT | Performed by: FAMILY MEDICINE

## 2022-11-23 RX ORDER — PANTOPRAZOLE SODIUM 40 MG/1
40 TABLET, DELAYED RELEASE ORAL
Qty: 30 TABLET | Refills: 2 | Status: SHIPPED | OUTPATIENT
Start: 2022-11-23

## 2022-11-23 RX ORDER — ALBUTEROL SULFATE 1.25 MG/3ML
1 SOLUTION RESPIRATORY (INHALATION) EVERY 6 HOURS PRN
Qty: 90 ML | Refills: 3 | Status: CANCELLED | OUTPATIENT
Start: 2022-11-23

## 2022-11-23 RX ORDER — FLUTICASONE PROPIONATE 110 UG/1
AEROSOL, METERED RESPIRATORY (INHALATION)
Qty: 36 G | Refills: 0 | Status: SHIPPED | OUTPATIENT
Start: 2022-11-23

## 2022-11-23 RX ORDER — FLUOCINONIDE 0.5 MG/G
OINTMENT TOPICAL
Qty: 60 G | Refills: 1 | Status: SHIPPED | OUTPATIENT
Start: 2022-11-23 | End: 2022-11-30

## 2022-11-23 RX ORDER — ALBUTEROL SULFATE 1.25 MG/3ML
1 SOLUTION RESPIRATORY (INHALATION) EVERY 6 HOURS PRN
Qty: 90 ML | Refills: 3 | Status: SHIPPED | OUTPATIENT
Start: 2022-11-23

## 2022-11-23 RX ORDER — ALBUTEROL SULFATE 90 UG/1
AEROSOL, METERED RESPIRATORY (INHALATION)
Qty: 17 G | Refills: 0 | Status: SHIPPED | OUTPATIENT
Start: 2022-11-23

## 2022-11-23 RX ORDER — AMMONIUM LACTATE 12 G/100G
CREAM TOPICAL
Qty: 385 G | Refills: 4 | Status: SHIPPED | OUTPATIENT
Start: 2022-11-23 | End: 2022-12-23

## 2022-11-23 RX ORDER — ACETAMINOPHEN 500 MG
TABLET ORAL EVERY 6 HOURS PRN
COMMUNITY

## 2022-11-23 RX ORDER — FEXOFENADINE HCL 180 MG/1
TABLET ORAL DAILY
COMMUNITY

## 2022-11-23 RX ORDER — SODIUM FLUORIDE 5 MG/G
GEL, DENTIFRICE DENTAL
COMMUNITY
Start: 2022-10-18

## 2022-11-23 ASSESSMENT — ENCOUNTER SYMPTOMS
RESPIRATORY NEGATIVE: 1
GASTROINTESTINAL NEGATIVE: 1

## 2022-11-23 ASSESSMENT — PATIENT HEALTH QUESTIONNAIRE - PHQ9
SUM OF ALL RESPONSES TO PHQ9 QUESTIONS 1 & 2: 0
SUM OF ALL RESPONSES TO PHQ QUESTIONS 1-9: 0
1. LITTLE INTEREST OR PLEASURE IN DOING THINGS: 0
SUM OF ALL RESPONSES TO PHQ QUESTIONS 1-9: 0
2. FEELING DOWN, DEPRESSED OR HOPELESS: 0
SUM OF ALL RESPONSES TO PHQ QUESTIONS 1-9: 0
SUM OF ALL RESPONSES TO PHQ QUESTIONS 1-9: 0

## 2022-11-23 NOTE — PROGRESS NOTES
OUTPATIENT PROGRESS NOTE  Date of Service:  11/23/2022  Address: [facility] [address] [address]  Dept: [phone]  Loc: [phone]    Subjective:      Patient ID:  [id number]  Magaly Segovia is a 40 y.o. female     HPI  Well exam  Dry skin on her hands  She hurt her left ankle   did xray normal  still hurts in morning  Having some ringing in right ear and felt dizzy  scheduled with ent     Review of Systems   Constitutional: Negative. HENT: Negative. Respiratory: Negative. Cardiovascular: Negative. Gastrointestinal: Negative. Skin:  Positive for rash. Neurological: Negative. Psychiatric/Behavioral:  Positive for decreased concentration. Objective:   YOB: 1985    Date of Visit:  11/23/2022       Allergies   Allergen Reactions    Amoxicillin     Carboxymethylcellulose Sodium Other (See Comments)    Duricef [Cefadroxil Monohydrate]     Penicillins     Tobramycin      Eye drops       Outpatient Medications Marked as Taking for the 11/23/22 encounter (Office Visit) with Zo Bustamante, DO   Medication Sig Dispense Refill    SODIUM FLUORIDE, DENTAL GEL, 1.1 % GEL Brush teeth with paste 2 times per day. albuterol sulfate HFA (PROVENTIL;VENTOLIN;PROAIR) 108 (90 Base) MCG/ACT inhaler Inhale 1-2 puffs into the lungs every 4-6 hours as needed. 17 g 0    hydrocortisone 2.5 % cream Apply topically to affected area(s) 2 times a day. 60 g 0    fluticasone (FLOVENT HFA) 110 MCG/ACT inhaler Inhale 2 puffs into the lungs 2 times a day.  36 g 0    ondansetron (ZOFRAN-ODT) 4 MG disintegrating tablet Take 4 mg by mouth every 6 hours as needed      pantoprazole (PROTONIX) 40 MG tablet Take 40 mg by mouth every morning (before breakfast)      albuterol (ACCUNEB) 1.25 MG/3ML nebulizer solution Inhale 3 mLs into the lungs every 6 hours as needed for Wheezing 90 mL 3       Vitals:    11/23/22 1312 BP: 108/72   Weight: 189 lb 4.8 oz (85.9 kg)   Height: 5' 3\" (1.6 m)     Body mass index is 33.53 kg/m². Wt Readings from Last 3 Encounters:   11/23/22 189 lb 4.8 oz (85.9 kg)   04/28/21 193 lb (87.5 kg)   10/30/20 196 lb (88.9 kg)     BP Readings from Last 3 Encounters:   11/23/22 108/72   04/28/21 98/60   10/30/20 122/70       Physical Exam  Vitals and nursing note reviewed. Constitutional:       Appearance: She is well-developed. HENT:      Head: Normocephalic. Neck:      Thyroid: No thyromegaly. Cardiovascular:      Rate and Rhythm: Normal rate and regular rhythm. Heart sounds: Normal heart sounds. Pulmonary:      Effort: Pulmonary effort is normal.      Breath sounds: Normal breath sounds. Abdominal:      General: There is no distension. Tenderness: There is no abdominal tenderness. Lymphadenopathy:      Cervical: No cervical adenopathy. Skin:     Comments: Bilat hands with dry scaly skin   Neurological:      Mental Status: She is alert and oriented to person, place, and time. Psychiatric:         Behavior: Behavior normal.         Thought Content: Thought content normal.         Judgment: Judgment normal.          Assessment/Plan       Assessment/plan;  Sherwin Blue was seen today for annual exam.    Diagnoses and all orders for this visit:    Well adult exam  -     CBC  -     Comprehensive Metabolic Panel  -     Lipid Panel  -     TSH  -     Hemoglobin A1C  -     Vitamin D 25 Hydroxy  -     Vitamin B12    Eczema of both hands    Fatigue, unspecified type  -     TSH  -     Vitamin D 25 Hydroxy  -     Vitamin B12    Family history of diabetes mellitus (DM)  -     Hemoglobin A1C    Other orders  -     pantoprazole (PROTONIX) 40 MG tablet; Take 1 tablet by mouth every morning (before breakfast)  -     fluticasone (FLOVENT HFA) 110 MCG/ACT inhaler; Inhale 2 puffs into the lungs 2 times a day. -     albuterol sulfate HFA (PROVENTIL;VENTOLIN;PROAIR) 108 (90 Base) MCG/ACT inhaler;  Inhale 1-2 puffs into the lungs every 4-6 hours as needed. -     albuterol (ACCUNEB) 1.25 MG/3ML nebulizer solution; Inhale 3 mLs into the lungs every 6 hours as needed for Wheezing  -     ammonium lactate (LAC-HYDRIN) 12 % cream; Apply topically as needed. -     fluocinonide (LIDEX) 0.05 % ointment; Apply topically 2 times daily. No follow-ups on file.      Marek Park, DO

## 2022-11-24 LAB
ESTIMATED AVERAGE GLUCOSE: 114 MG/DL
HBA1C MFR BLD: 5.6 %

## 2022-11-28 ENCOUNTER — TELEPHONE (OUTPATIENT)
Dept: FAMILY MEDICINE CLINIC | Age: 37
End: 2022-11-28

## 2022-11-28 NOTE — TELEPHONE ENCOUNTER
Pt call said she received a letter from [facility] regarding her mammagram.  Letter said there is recommended testing due to her breast density and category 2 finding on beign area   pt would like a call to discuss.       [phone]

## 2022-11-29 NOTE — TELEPHONE ENCOUNTER
No   her mammogram is normal but due to multiple family members with breast cancer and her breast tissue is dense which may make it difficult to see any small changes in her breast tissue the radiologist recommends she be seen in high risk breast clinic at [facility]      it would be a good idea to go once they will review her mammogram and take a more extensive history and breast exam and see if any additional testing needed.

## 2023-06-22 RX ORDER — ALBUTEROL SULFATE 90 UG/1
AEROSOL, METERED RESPIRATORY (INHALATION)
Qty: 17 G | Refills: 0 | Status: SHIPPED | OUTPATIENT
Start: 2023-06-22

## 2023-06-22 RX ORDER — PANTOPRAZOLE SODIUM 40 MG/1
TABLET, DELAYED RELEASE ORAL
Qty: 30 TABLET | Refills: 2 | Status: SHIPPED | OUTPATIENT
Start: 2023-06-22

## 2023-06-22 NOTE — TELEPHONE ENCOUNTER
Last office visit 11/23/2022       Next office visit scheduled Visit date not found    Requested Prescriptions     Pending Prescriptions Disp Refills    albuterol sulfate HFA (PROVENTIL;VENTOLIN;PROAIR) 108 (90 Base) MCG/ACT inhaler [Pharmacy Med Name: albuterol 90 mcg/actuation Inhl inhaler] 17 g 0     Sig: Inhale 1-2 puffs into the lungs every 4-6 hours as needed.     pantoprazole (PROTONIX) 40 MG tablet [Pharmacy Med Name: pantoprazole 40 mg tablet,delayed release (PROTONIX)] 30 tablet 2     Sig: Take 1 tablet (40 mg total) by mouth every morning before breakfast.

## 2023-08-23 RX ORDER — ALBUTEROL SULFATE 90 UG/1
AEROSOL, METERED RESPIRATORY (INHALATION)
Qty: 17 G | Refills: 0 | Status: SHIPPED | OUTPATIENT
Start: 2023-08-23

## 2024-01-12 ENCOUNTER — OFFICE VISIT (OUTPATIENT)
Dept: FAMILY MEDICINE CLINIC | Age: 39
End: 2024-01-12

## 2024-01-12 VITALS
SYSTOLIC BLOOD PRESSURE: 110 MMHG | DIASTOLIC BLOOD PRESSURE: 62 MMHG | WEIGHT: 193.6 LBS | HEIGHT: 63 IN | BODY MASS INDEX: 34.3 KG/M2

## 2024-01-12 DIAGNOSIS — N39.0 URINARY TRACT INFECTION WITH HEMATURIA, SITE UNSPECIFIED: ICD-10-CM

## 2024-01-12 DIAGNOSIS — Z00.00 WELL ADULT EXAM: Primary | ICD-10-CM

## 2024-01-12 DIAGNOSIS — R31.9 URINARY TRACT INFECTION WITH HEMATURIA, SITE UNSPECIFIED: ICD-10-CM

## 2024-01-12 LAB
BILIRUBIN, POC: ABNORMAL
BLOOD URINE, POC: ABNORMAL
CLARITY, POC: YELLOW
COLOR, POC: YELLOW
GLUCOSE URINE, POC: ABNORMAL
KETONES, POC: ABNORMAL
LEUKOCYTE EST, POC: ABNORMAL
NITRITE, POC: ABNORMAL
PH, POC: 5.5
PROTEIN, POC: ABNORMAL
SPECIFIC GRAVITY, POC: 1.03
UROBILINOGEN, POC: ABNORMAL

## 2024-01-12 SDOH — ECONOMIC STABILITY: HOUSING INSECURITY
IN THE LAST 12 MONTHS, WAS THERE A TIME WHEN YOU DID NOT HAVE A STEADY PLACE TO SLEEP OR SLEPT IN A SHELTER (INCLUDING NOW)?: NO

## 2024-01-12 SDOH — ECONOMIC STABILITY: INCOME INSECURITY: HOW HARD IS IT FOR YOU TO PAY FOR THE VERY BASICS LIKE FOOD, HOUSING, MEDICAL CARE, AND HEATING?: NOT HARD AT ALL

## 2024-01-12 SDOH — ECONOMIC STABILITY: FOOD INSECURITY: WITHIN THE PAST 12 MONTHS, YOU WORRIED THAT YOUR FOOD WOULD RUN OUT BEFORE YOU GOT MONEY TO BUY MORE.: NEVER TRUE

## 2024-01-12 SDOH — ECONOMIC STABILITY: FOOD INSECURITY: WITHIN THE PAST 12 MONTHS, THE FOOD YOU BOUGHT JUST DIDN'T LAST AND YOU DIDN'T HAVE MONEY TO GET MORE.: NEVER TRUE

## 2024-01-12 ASSESSMENT — PATIENT HEALTH QUESTIONNAIRE - PHQ9
SUM OF ALL RESPONSES TO PHQ QUESTIONS 1-9: 0
SUM OF ALL RESPONSES TO PHQ9 QUESTIONS 1 & 2: 0
2. FEELING DOWN, DEPRESSED OR HOPELESS: 0
1. LITTLE INTEREST OR PLEASURE IN DOING THINGS: 0

## 2024-01-12 NOTE — PROGRESS NOTES
Subjective:      Patient ID: Leny Arias is a 38 y.o. female.    HPI  Well exam  She is doing well   few issues to discuss   Tinnitus  saw ent in past about this and would like referral to see deifferent ent  Dermatofibroma   spot on her leg which is red and raised and itchy    Review of Systems   Constitutional: Negative.    HENT:  Positive for tinnitus.    Respiratory: Negative.     Cardiovascular: Negative.    Gastrointestinal: Negative.    Skin: Negative.    Neurological: Negative.      Objective:   Physical Exam  Constitutional:       Appearance: Normal appearance. She is normal weight.   HENT:      Head: Normocephalic.      Right Ear: Tympanic membrane, ear canal and external ear normal.      Left Ear: Tympanic membrane, ear canal and external ear normal.      Nose: Nose normal.      Mouth/Throat:      Mouth: Mucous membranes are moist.   Cardiovascular:      Rate and Rhythm: Normal rate and regular rhythm.      Pulses: Normal pulses.      Heart sounds: Normal heart sounds.   Pulmonary:      Effort: Pulmonary effort is normal.      Breath sounds: Normal breath sounds.   Musculoskeletal:         General: Normal range of motion.   Neurological:      General: No focal deficit present.      Mental Status: She is alert.         Assessment:            Plan:      Assessment/plan;  Leny was seen today for annual exam.    Diagnoses and all orders for this visit:    Well adult exam  -     CBC; Future  -     Iron; Future  -     TSH; Future  -     Lipid Panel; Future  -     Hemoglobin A1C; Future  -     HIV Screen; Future  -     Hepatitis C Antibody; Future  -     C.trachomatis N.gonorrhoeae DNA, Urine ([place] Only)  -     ABO/RH; Future  -     Sickle Cell Screen; Future  -     POCT Urinalysis no Micro  -     TYPE AND SCREEN; Future  -     Syphilis Antibody Cascading Reflex; Future  -     Culture, Urine    Urinary tract infection with hematuria, site unspecified  -     Culture, Urine      No follow-ups on

## 2024-01-13 ASSESSMENT — ENCOUNTER SYMPTOMS
GASTROINTESTINAL NEGATIVE: 1
RESPIRATORY NEGATIVE: 1

## 2024-01-15 LAB
C TRACH DNA UR QL NAA+PROBE: NEGATIVE
N GONORRHOEA DNA UR QL NAA+PROBE: NEGATIVE

## 2024-01-16 LAB — BACTERIA UR CULT: NORMAL

## 2024-02-28 LAB
ABO GROUPING: NORMAL
ANTIBODY SCREEN: NEGATIVE
CHOLESTEROL, TOTAL: 207 MG/DL (ref 0–200)
HBA1C MFR BLD: 5.7 % (ref 4–5.6)
HCT VFR BLD CALC: 41.1 % (ref 35–45)
HDLC SERPL-MCNC: 72 MG/DL (ref 60–92)
HEMOGLOBIN: 14 G/DL (ref 11.7–15.5)
HEPATITIS C VIRUS RNA SER/PLAS NCNC: NONREACTIVE
HIV 1+2 AB+HIV1P24 AG, EIA: NONREACTIVE
IRON: 122 UG/DL (ref 50–212)
LDL CHOLESTEROL CALCULATED: 122 MG/DL
MCH RBC QN AUTO: 29.8 PG (ref 27–33)
MCHC RBC AUTO-ENTMCNC: 34 G/DL (ref 32–36)
MCV RBC AUTO: 87.8 FL (ref 80–100)
NON-HDL CHOLESTEROL: 135 MG/DL (ref 0–129)
PDW BLD-RTO: 14.1 % (ref 11–15)
PLATELET # BLD: 282 10E3/UL (ref 140–400)
PMV BLD AUTO: 8.9 FL (ref 7.5–11.5)
RBC # BLD: 4.69 10E6/UL (ref 3.8–5.1)
RH FACTOR: POSITIVE
SAT: 31 % (ref 15–55)
TOTAL IRON BINDING CAPACITY: 393 UG/DL (ref 265–497)
TRIGL SERPL-MCNC: 63 MG/DL (ref 10–149)
WBC # BLD: 5.3 10E3/UL (ref 3.8–10.8)

## 2024-02-28 RX ORDER — PANTOPRAZOLE SODIUM 40 MG/1
TABLET, DELAYED RELEASE ORAL
Qty: 30 TABLET | Refills: 0 | Status: SHIPPED | OUTPATIENT
Start: 2024-02-28

## 2024-02-28 NOTE — TELEPHONE ENCOUNTER
Last office visit 1/12/2024     Last written      Next office visit scheduled Visit date not found    Requested Prescriptions     Pending Prescriptions Disp Refills    pantoprazole (PROTONIX) 40 MG tablet [Pharmacy Med Name: pantoprazole 40 mg tablet,delayed release (PROTONIX)] 30 tablet 0     Sig: Take 1 tablet (40 mg total) by mouth every morning before breakfast.

## 2024-02-29 LAB
RPR: NORMAL
SICKLE CELL SCREEN: NEGATIVE
TSH, 3RD GENERATION: 0.94 UIU/ML (ref 0.45–4.12)

## 2024-04-17 RX ORDER — PANTOPRAZOLE SODIUM 40 MG/1
TABLET, DELAYED RELEASE ORAL
Qty: 30 TABLET | Refills: 0 | Status: SHIPPED | OUTPATIENT
Start: 2024-04-17

## 2024-04-17 NOTE — TELEPHONE ENCOUNTER
Pt called requesting protonix 40 MG tablets for a three month supply be sent to the [facility] [address] [facility].

## 2024-05-01 ENCOUNTER — PATIENT MESSAGE (OUTPATIENT)
Dept: FAMILY MEDICINE CLINIC | Age: 39
End: 2024-05-01

## 2024-05-01 RX ORDER — PANTOPRAZOLE SODIUM 40 MG/1
TABLET, DELAYED RELEASE ORAL
Qty: 90 TABLET | Refills: 2 | Status: SHIPPED | OUTPATIENT
Start: 2024-05-01

## 2024-05-01 NOTE — TELEPHONE ENCOUNTER
From: Leny Arias  To: Dr. Cierra Eubanks  Sent: 5/1/2024 1:56 PM EDT  Subject: Prescription question    Good afternoon Dr. Eubanks,  I called a few weeks ago to get a 3 months supply on my Protonix. I was told that I need to make an appointment. I saw you in January and was under the impression that I didn't need a follow up appointment. I was wondering if there was a concern or if you could advise in the need for an appointment.  Thank you,  Leny Arias

## 2024-10-30 ENCOUNTER — OFFICE VISIT (OUTPATIENT)
Dept: FAMILY MEDICINE CLINIC | Age: 39
End: 2024-10-30

## 2024-10-30 VITALS
HEIGHT: 63 IN | WEIGHT: 192.4 LBS | SYSTOLIC BLOOD PRESSURE: 110 MMHG | DIASTOLIC BLOOD PRESSURE: 70 MMHG | BODY MASS INDEX: 34.09 KG/M2

## 2024-10-30 DIAGNOSIS — S93.401S SPRAIN OF RIGHT ANKLE, UNSPECIFIED LIGAMENT, SEQUELA: ICD-10-CM

## 2024-10-30 DIAGNOSIS — S13.4XXS WHIPLASH INJURY TO NECK, SEQUELA: ICD-10-CM

## 2024-10-30 DIAGNOSIS — S43.401S SPRAIN OF RIGHT SHOULDER, UNSPECIFIED SHOULDER SPRAIN TYPE, SEQUELA: ICD-10-CM
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
## 2024-10-30 ASSESSMENT — ENCOUNTER SYMPTOMS
BACK PAIN: 1
GASTROINTESTINAL NEGATIVE: 1
RESPIRATORY NEGATIVE: 1
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
## 2024-12-06 RX ORDER — FLUTICASONE PROPIONATE 110 UG/1
AEROSOL, METERED RESPIRATORY (INHALATION)
Qty: 36 G | Refills: 0 | Status: SHIPPED | OUTPATIENT
Start: 2024-12-06

## 2024-12-06 RX ORDER — ALBUTEROL SULFATE 90 UG/1
2 INHALANT RESPIRATORY (INHALATION) EVERY 4 HOURS PRN
Qty: 17 G | Refills: 0 | Status: SHIPPED | OUTPATIENT
Start: 2024-12-06

## 2024-12-06 NOTE — TELEPHONE ENCOUNTER
Patient called requesting a refill of her albuterol inhaler and fluticasone inhaler to be sent to [facility]    Last office visit 10/30/2024   Next office visit scheduled Visit date not found

## 2024-12-17 ENCOUNTER — TELEPHONE (OUTPATIENT)
Dept: FAMILY MEDICINE CLINIC | Age: 39
End: 2024-12-17
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
## 2024-12-17 NOTE — TELEPHONE ENCOUNTER
Pharmacy called stating the patient's insurance will not cover the patient's prescription for flovent inhaler. Patient's insurance will cover a Qvar inhaler or an Asmanex inhaler. Please notify pharmacy

## 2024-12-18 NOTE — TELEPHONE ENCOUNTER
Spoke with patient and informed her that we will call and update that. Patient verbalized understanding.

## 2025-01-21 ENCOUNTER — OFFICE VISIT (OUTPATIENT)
Dept: FAMILY MEDICINE CLINIC | Age: 40
End: 2025-01-21

## 2025-01-21 VITALS — HEIGHT: 63 IN | WEIGHT: 194 LBS | BODY MASS INDEX: 34.38 KG/M2

## 2025-01-21 DIAGNOSIS — S93.401S SPRAIN OF RIGHT ANKLE, UNSPECIFIED LIGAMENT, SEQUELA: ICD-10-CM
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
## 2025-01-21 DIAGNOSIS — S46.011S TRAUMATIC TEAR OF RIGHT ROTATOR CUFF, UNSPECIFIED TEAR EXTENT, SEQUELA: Primary | ICD-10-CM

## 2025-01-21 DIAGNOSIS — S13.4XXS WHIPLASH INJURY TO NECK, SEQUELA: ICD-10-CM

## 2025-01-21 ASSESSMENT — ENCOUNTER SYMPTOMS
GASTROINTESTINAL NEGATIVE: 1
RESPIRATORY NEGATIVE: 1

## 2025-03-11 ENCOUNTER — PATIENT MESSAGE (OUTPATIENT)
Dept: FAMILY MEDICINE CLINIC | Age: 40
End: 2025-03-11

## 2025-03-11 DIAGNOSIS — M79.671 RIGHT FOOT PAIN: Primary | ICD-10-CM

## 2025-04-05 ENCOUNTER — RESULTS FOLLOW-UP (OUTPATIENT)
Dept: FAMILY MEDICINE CLINIC | Age: 40
End: 2025-04-05

## 2025-04-25 NOTE — TELEPHONE ENCOUNTER
Pharmacy called with a request for a 90-day supply of  mometasone (ASMANEX, 30 METERED DOSES,) 110 MCG/ACT AEPB     Last office visit 1/21/2025   Next office visit scheduled Visit date not found

## 2025-05-30 RX ORDER — ALBUTEROL SULFATE 90 UG/1
2 INHALANT RESPIRATORY (INHALATION) EVERY 4 HOURS PRN
Qty: 17 G | Refills: 0 | Status: SHIPPED | OUTPATIENT
Start: 2025-05-30

## 2025-05-30 RX ORDER — PANTOPRAZOLE SODIUM 40 MG/1
TABLET, DELAYED RELEASE ORAL
Qty: 90 TABLET | Refills: 2 | Status: SHIPPED | OUTPATIENT
Start: 2025-05-30

## 2025-05-30 NOTE — TELEPHONE ENCOUNTER
Last office visit 1/21/2025    Next office visit scheduled Visit date not found    Requested Prescriptions     Pending Prescriptions Disp Refills    pantoprazole (PROTONIX) 40 MG tablet [Pharmacy Med Name: pantoprazole 40 mg tablet,delayed release (PROTONIX)] 90 tablet 2     Sig: Take 1 tablet (40 mg total) by mouth every morning before breakfast.    albuterol sulfate HFA (PROVENTIL;VENTOLIN;PROAIR) 108 (90 Base) MCG/ACT inhaler [Pharmacy Med Name: albuterol 90 mcg/actuation Inhl inhaler] 17 g 0     Sig: Inhale 2 puffs into the lungs every 4 hours as needed for Wheezing

## 2025-06-02 NOTE — TELEPHONE ENCOUNTER
Patient needs a prescription for Mometasone  furoate inhalation powder She needs a 90 day supply sent to [facility]      Patient had mammogram 12/18/24, insurance denied, patient wanted to see if Dr. Eubanks can write a letter stating it was necessary.         Last office visit 1/21/2025       Next office visit scheduled Visit date not found

## 2025-06-04 NOTE — TELEPHONE ENCOUNTER
Patient called and asking if Dr Eubanks could write her letter, due to her Mammogram been denied by the insurance in 12/18/24.

## 2025-07-02 ENCOUNTER — OFFICE VISIT (OUTPATIENT)
Dept: FAMILY MEDICINE CLINIC | Age: 40
End: 2025-07-02
Payer: COMMERCIAL

## 2025-07-02 VITALS
DIASTOLIC BLOOD PRESSURE: 68 MMHG | WEIGHT: 193 LBS | BODY MASS INDEX: 34.2 KG/M2 | HEIGHT: 63 IN | SYSTOLIC BLOOD PRESSURE: 110 MMHG

## 2025-07-02 DIAGNOSIS — M75.81 ROTATOR CUFF TENDONITIS, RIGHT: Primary | ICD-10-CM

## 2025-07-02 DIAGNOSIS — M72.2 PLANTAR FASCIITIS OF RIGHT FOOT: ICD-10-CM

## 2025-07-02 PROCEDURE — 99213 OFFICE O/P EST LOW 20 MIN: CPT | Performed by: FAMILY MEDICINE

## 2025-07-02 SDOH — ECONOMIC STABILITY: FOOD INSECURITY: WITHIN THE PAST 12 MONTHS, YOU WORRIED THAT YOUR FOOD WOULD RUN OUT BEFORE YOU GOT MONEY TO BUY MORE.: NEVER TRUE

## 2025-07-02 SDOH — ECONOMIC STABILITY: FOOD INSECURITY: WITHIN THE PAST 12 MONTHS, THE FOOD YOU BOUGHT JUST DIDN'T LAST AND YOU DIDN'T HAVE MONEY TO GET MORE.: NEVER TRUE

## 2025-07-02 ASSESSMENT — ENCOUNTER SYMPTOMS
RESPIRATORY NEGATIVE: 1
GASTROINTESTINAL NEGATIVE: 1

## 2025-07-02 ASSESSMENT — PATIENT HEALTH QUESTIONNAIRE - PHQ9
SUM OF ALL RESPONSES TO PHQ QUESTIONS 1-9: 0
2. FEELING DOWN, DEPRESSED OR HOPELESS: NOT AT ALL
1. LITTLE INTEREST OR PLEASURE IN DOING THINGS: NOT AT ALL
SUM OF ALL RESPONSES TO PHQ QUESTIONS 1-9: 0
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
## 2025-07-02 NOTE — PROGRESS NOTES
OUTPATIENT PROGRESS NOTE  Date of Service:  7/2/2025  Address: [facility]  [facility]  [address] [place] [address]  [address]  [place] [address]  Dept: [phone]  Loc: [phone]    Subjective:      Patient ID:  [id number]  Leny Arias is a 40 y.o. female     HPI      Review of Systems    Objective:   YOB: 1985    Date of Visit:  7/2/2025       Allergies   Allergen Reactions    Amoxicillin     Carboxymethylcellulose Sodium Other (See Comments)    Duricef [Cefadroxil Monohydrate]     Penicillins     Tobramycin      Eye drops       Outpatient Medications Marked as Taking for the 7/2/25 encounter (Office Visit) with Cierra Eubanks DO   Medication Sig Dispense Refill    mometasone (ASMANEX, 30 METERED DOSES,) 110 MCG/ACT AEPB inhaler Inhale 2 puffs into the lungs 2 times daily 360 puff 0    pantoprazole (PROTONIX) 40 MG tablet Take 1 tablet (40 mg total) by mouth every morning before breakfast. 90 tablet 2    albuterol sulfate HFA (PROVENTIL;VENTOLIN;PROAIR) 108 (90 Base) MCG/ACT inhaler Inhale 2 puffs into the lungs every 4 hours as needed for Wheezing 17 g 0    fluticasone (FLOVENT HFA) 110 MCG/ACT inhaler Inhale 2 puffs into the lungs 2 times a day. 36 g 0    tiZANidine (ZANAFLEX) 4 MG tablet Take 1 tablet by mouth 3 times daily as needed (muscle spasm) 30 tablet 0    hydrocortisone 2.5 % cream Apply topically to affected area(s) 2 times a day. 60 g 0    acetaminophen (TYLENOL) 500 MG tablet Take by mouth every 6 hours as needed      fexofenadine (ALLEGRA) 180 MG tablet Take by mouth daily      SODIUM FLUORIDE, DENTAL GEL, 1.1 % GEL Brush teeth with paste 2 times per day.      albuterol (ACCUNEB) 1.25 MG/3ML nebulizer solution Inhale 3 mLs into the lungs every 6 hours as needed for Wheezing 90 mL 3    cyclobenzaprine (FLEXERIL) 10 MG tablet Take 1 tablet by mouth 3 times daily as needed      mometasone (ASMANEX, 30 METERED DOSES,) 220 MCG/INH inhaler